# Patient Record
Sex: MALE | Race: BLACK OR AFRICAN AMERICAN | Employment: OTHER | URBAN - METROPOLITAN AREA
[De-identification: names, ages, dates, MRNs, and addresses within clinical notes are randomized per-mention and may not be internally consistent; named-entity substitution may affect disease eponyms.]

---

## 2019-03-11 ENCOUNTER — HOSPITAL ENCOUNTER (EMERGENCY)
Age: 64
Discharge: HOME | End: 2019-03-11
Attending: EMERGENCY MEDICINE | Admitting: EMERGENCY MEDICINE
Payer: OTHER MISCELLANEOUS

## 2019-03-11 VITALS
HEART RATE: 90 BPM | DIASTOLIC BLOOD PRESSURE: 86 MMHG | TEMPERATURE: 93.6 F | RESPIRATION RATE: 20 BRPM | BODY MASS INDEX: 14.07 KG/M2 | SYSTOLIC BLOOD PRESSURE: 134 MMHG | WEIGHT: 95 LBS | OXYGEN SATURATION: 100 % | HEIGHT: 69 IN

## 2019-03-11 DIAGNOSIS — Z51.5 HOSPICE CARE PATIENT: ICD-10-CM

## 2019-03-11 DIAGNOSIS — E86.0 DEHYDRATION: Primary | ICD-10-CM

## 2019-03-11 LAB — GLUCOSE BLD STRIP.AUTO-MCNC: 148 MG/DL (ref 70–110)

## 2019-03-11 PROCEDURE — 82962 GLUCOSE BLOOD TEST: CPT

## 2019-03-11 PROCEDURE — 96360 HYDRATION IV INFUSION INIT: CPT

## 2019-03-11 PROCEDURE — 99284 EMERGENCY DEPT VISIT MOD MDM: CPT

## 2019-03-11 PROCEDURE — 74011250636 HC RX REV CODE- 250/636: Performed by: EMERGENCY MEDICINE

## 2019-03-11 RX ADMIN — SODIUM CHLORIDE 500 ML: 900 INJECTION, SOLUTION INTRAVENOUS at 08:55

## 2019-03-11 NOTE — ED PROVIDER NOTES
EMERGENCY DEPARTMENT HISTORY AND PHYSICAL EXAM    8:42 AM      Date: 3/11/2019  Patient Name: Delmar Moura    History of Presenting Illness     Chief Complaint   Patient presents with    Shortness of Breath    Low Blood Sugar         History Provided By: Patient and Hospice Nurse, Relatives, Friends    Additional History (Context): Delmar Moura is a 61 y.o. male with hypertension and hospice, CAD, cancer who presents via EMS in respiratory distress with CPAP in place. EMS reports the pt had several brief episodes of apnea last several seconds as well as coarse breathing sound, that worsened after DuoNeb so the pt was placed on CPAP. Glucose was initially 33 and increased to 466 after 1 amp of D50. BP was 78/50, pt started on fluids in route. Multiple family members at home report the pt is a DNR. Pt states he is a DNR. Family communicated to the pt's hospice nurese that they were requesting IV fluids for the pt as they suspect he is dehydrated since the pt hasn't been eating or drinking. Pt was recently diagnosed with the flu. The pt denies pain of any kind. No other concerns or symptoms at this time. PCP: Ronda Xavier MD    Chief Complaint: respiratory distress  Duration:  Minutes  Timing:  acute on chronic  Location: n/a  Quality: n/a  Severity: Severe  Modifying Factors: hospice  Associated Symptoms: denies any other associated signs or symptoms      Past History     Past Medical History:  Past Medical History:   Diagnosis Date    CAD (coronary artery disease)     Cancer ([facility] [address]     Laryngeal     Diastolic dysfunction     Fatty liver     Heart attack ([facility] [address]     Hepatitis C     Hypertension     Low back pain     Nephrolithiasis        Past Surgical History:  Past Surgical History:   Procedure Laterality Date    HX CATARACT REMOVAL      HX RETINAL DETACHMENT REPAIR         Family History:  History reviewed. No pertinent family history.     Social History:  Social History     Tobacco Use    Smoking status: Current Every Day Smoker    Smokeless tobacco: Never Used   Substance Use Topics    Alcohol use: No    Drug use: No       Allergies: Allergies   Allergen Reactions    Darvocet A500 [Propoxyphene N-Acetaminophen] Anaphylaxis    Darvon [Propoxyphene] Anaphylaxis    Tramadol Swelling         Review of Systems     Review of Systems   Constitutional: Negative for chills and fever. HENT: Negative for congestion, rhinorrhea, sore throat and trouble swallowing. Eyes: Negative for visual disturbance. Respiratory: Positive for shortness of breath. Negative for cough and wheezing. Cardiovascular: Negative for chest pain and leg swelling. Gastrointestinal: Negative for abdominal pain, nausea and vomiting. Endocrine: Negative for polyuria. Genitourinary: Negative for difficulty urinating and dysuria. Musculoskeletal: Negative for arthralgias and neck stiffness. Skin: Negative for rash. Neurological: Negative for dizziness, weakness, numbness and headaches. Hematological: Does not bruise/bleed easily. Psychiatric/Behavioral: Negative for confusion and dysphoric mood. All other systems reviewed and are negative. Physical Exam     Visit Vitals  /86 (BP 1 Location: Left arm, BP Patient Position: At rest)   Pulse 90   Temp (!) 93.6 °F (34.2 °C)   Resp 20   Ht 5' 9\" (1.753 m)   Wt 43.1 kg (95 lb)   SpO2 100%   BMI 14.03 kg/m²         Physical Exam   Constitutional: He is oriented to person, place, and time. He appears cachectic. Cachetic, speaks a few words here and there   HENT:   Head: Normocephalic and atraumatic. Nose: Nose normal.   Mouth/Throat: Oropharynx is clear and moist. Mucous membranes are dry. No oropharyngeal exudate. Eyes: Conjunctivae are normal. Pupils are equal, round, and reactive to light. No scleral icterus. Neck: Normal range of motion. Neck supple. No tracheal deviation present. Cardiovascular: Normal rate and intact distal pulses. Pulmonary/Chest: Effort normal and breath sounds normal. No stridor. No respiratory distress. He has no wheezes. 82% on RA, HR nml in 90s, /72, respirations 16   Abdominal: Soft. Bowel sounds are normal. He exhibits no distension. There is no tenderness. Musculoskeletal: Normal range of motion. He exhibits no edema or tenderness. No LE edema   Lymphadenopathy:     He has no cervical adenopathy. Neurological: He is oriented to person, place, and time. No cranial nerve deficit. Awake, but somewhat lethargic; responds by shaking head yes and no, says a few words. Moving all extremities. Eomi. Skin: Skin is dry. He is not diaphoretic. Poor skin turgor   Nursing note and vitals reviewed. Diagnostic Study Results     Labs -  Recent Results (from the past 12 hour(s))   GLUCOSE, POC    Collection Time: 03/11/19  9:07 AM   Result Value Ref Range    Glucose (POC) 148 (H) 70 - 110 mg/dL       Radiologic Studies -   No orders to display          Medical Decision Making   I am the first provider for this patient. I reviewed the vital signs, available nursing notes, past medical history, past surgical history, family history and social history. Vital Signs-Reviewed the patient's vital signs. Pulse Oximetry Analysis -  82% on room air (Interpretation)hypoxic    Records Reviewed: Nursing Notes and Old Medical Records (Time of Review: 8:42 AM)    Provider Notes (Medical Decision Making): MDM     Pt place on emergent non-rebreathing after removing CPAP. DDx; known viral illness, on hospice,DNR. Plan to contact hospice and determine scope of visit and what they want me to do. Family just asking for IVF.     Medications   sodium chloride 0.9 % bolus infusion 500 mL (500 mL IntraVENous New Bag 3/11/19 0855)         ED Course: Progress Notes, Reevaluation, and Consults:  8:52 AM Consult:  Discussed care with Rupert An RN hospice (  Standard discussion; including history of patients chief complaint, available diagnostic results, and treatment course. Pt is DNR. Family was hoping for IV fluids. States hospice don't generally recommend this but they wanted to respect family wishs to precede with IVF only, no blood work, no extreme measures. States we are to send the pt back home after pt gets fluids. 10:09 AM  Pt is much more alert and saying he wants to go home. After warm blankets, repeat temp normalized as I'm at bedside. I have discussed plan and dx's with pt and hospice RN. She wants recommends discharge pt back to hospice. Diagnosis     Clinical Impression:   1. Dehydration    2. Hospice care patient        Disposition: d/c    Follow-up Information     Follow up With Specialties Details Why Contact Info    Return to [address] [place] [address]                  Medication List      ASK your doctor about these medications    azithromycin 250 mg tablet  Commonly known as:  ZITHROMAX Z-LACHELLE  Take 2 tablets PO day 1, then take 1 tablet PO day 2-5     chlorpheniramine-HYDROcodone 10-8 mg/5 mL suspension  Commonly known as:  TUSSIONEX PENNKINETIC ER  Take 5 mL by mouth every twelve (12) hours as needed for Cough.          _______________________________    Attestations:  Will [address] [place] [address] acting as a scribe for and in the presence of Yolande Padilla DO.       March 11, 2019 at 8:42 AM       Provider Attestation:      I personally performed the services described in the documentation, reviewed the documentation, as recorded by the scribe in my presence, and it accurately and completely records my words and actions.  March 11, 2019 at 8:42 AM - Yolande Padilla DO.    _______________________________

## 2019-03-11 NOTE — ED NOTES
I have reviewed discharge instructions with the patient and caregiver. The patient and caregiver verbalized understanding. Discharged home via wheelchair, in stable condition.

## 2019-03-11 NOTE — DISCHARGE INSTRUCTIONS
Patient Education        Dehydration: Care Instructions  Your Care Instructions  Dehydration happens when your body loses too much fluid. This might happen when you do not drink enough water or you lose large amounts of fluids from your body because of diarrhea, vomiting, or sweating. Severe dehydration can be life-threatening. Water and minerals called electrolytes help put your body fluids back in balance. Learn the early signs of fluid loss, and drink more fluids to prevent dehydration. Follow-up care is a key part of your treatment and safety. Be sure to make and go to all appointments, and call your doctor if you are having problems. It's also a good idea to know your test results and keep a list of the medicines you take. How can you care for yourself at home? · To prevent dehydration, drink plenty of fluids, enough so that your urine is light yellow or clear like water. Choose water and other caffeine-free clear liquids until you feel better. If you have kidney, heart, or liver disease and have to limit fluids, talk with your doctor before you increase the amount of fluids you drink. · If you do not feel like eating or drinking, try taking small sips of water, sports drinks, or other rehydration drinks. · Get plenty of rest.  To prevent dehydration  · Add more fluids to your diet and daily routine, unless your doctor has told you not to. · During hot weather, drink more fluids. Drink even more fluids if you exercise a lot. Stay away from drinks with alcohol or caffeine. · Watch for the symptoms of dehydration. These include:  ? A dry, sticky mouth. ? Dark yellow urine, and not much of it. ? Dry and sunken eyes. ? Feeling very tired. · Learn what problems can lead to dehydration. These include:  ? Diarrhea, fever, and vomiting. ? Any illness with a fever, such as pneumonia or the flu. ?  Activities that cause heavy sweating, such as endurance races and heavy outdoor work in hot or humid weather. ? Alcohol or drug abuse or withdrawal.  ? Certain medicines, such as cold and allergy pills (antihistamines), diet pills (diuretics), and laxatives. ? Certain diseases, such as diabetes, cancer, and heart or kidney disease. When should you call for help? Call 911 anytime you think you may need emergency care. For example, call if:    · You passed out (lost consciousness).    Call your doctor now or seek immediate medical care if:    · You are confused and cannot think clearly.     · You are dizzy or lightheaded, or you feel like you may faint.     · You have signs of needing more fluids. You have sunken eyes and a dry mouth, and you pass only a little dark urine.     · You cannot keep fluids down.    Watch closely for changes in your health, and be sure to contact your doctor if:    · You are not making tears.     · Your skin is very dry and sags slowly back into place after you pinch it.     · Your mouth and eyes are very dry. Where can you learn more? Go to http://shaan-leonora.info/. Enter N371 in the search box to learn more about \"Dehydration: Care Instructions. \"  Current as of: September 23, 2018  Content Version: 11.9  © 5454-9935 [employer]. Care instructions adapted under license by [employer] (which disclaims liability or warranty for this information). If you have questions about a medical condition or this instruction, always ask your healthcare professional. [place] [address] any warranty or liability for your use of this information. Patient Education        Learning About Hospice and Palliative Care  What are hospice and palliative care? Palliative (say \"PAL-shira-uh-tiv\") care is an area of medicine that helps give you more good days by providing care for quality-of-life issues. It includes treating symptoms like pain, nausea, or sleep problems.  It can also include helping you and your loved ones to:  · Understand your illness better. · Talk more openly about your feelings. · Decide what treatments you want or don't want. · Communicate better with your doctors, nurses, and each other. Hospice care is a type of palliative care. But it's for people who are near the end of life. What kinds of care are involved? Palliative care: This treatment helps you feel better physically, emotionally, and spiritually while doctors also treat your illness. Your care may include pain relief, counseling, or nutrition advice. Hospice care: Again, the goal of this type of care is to help you feel better. And it can help you get the most out of the time you have left. But you no longer get treatment to try to cure your illness. When does care happen? Palliative care: This care can happen at any time during a serious illness. You don't have to be near death to get this care. Hospice care: In most cases, you can choose hospice care when your doctor believes that you have no more than about 6 months to live. Where does the care happen? Palliative care: This care often happens in hospitals or long-term care facilities like nursing homes. It can take place wherever you are treated, even in your home. Hospice care: Most hospice care is done in the place the patient calls \"home. \" This is often the person's home. But it could also be a place like a nursing home or snf center. Hospice care may also be given in hospice centers, hospitals, and other places. Who provides the care? Palliative care: There are doctors and nurses who specialize in this field. But your own doctor may also give some of this care. And there are many other experts who may help you. These include social workers, counselors, therapists, and nutrition experts. Hospice care: In hospitals, hospice centers, and other facilities, care is given by doctors, nurses, and others who are trained in hospice care. In the home, a family member is often the main caregiver.  But the family member gets help from care experts. They are on call 24 hours a day. Where can you learn more? Go to http://shaan-leonora.info/. Enter 466 [id number] in the search box to learn more about \"Learning About Hospice and Palliative Care. \"  Current as of: April 18, 2018  Content Version: 11.9  © 4782-1617 [employer], [employer] Care instructions adapted under license by [employer] (which disclaims liability or warranty for this information). If you have questions about a medical condition or this instruction, always ask your healthcare professional. [place] [address] any warranty or liability for your use of this information.

## 2024-10-16 NOTE — ED TRIAGE NOTES
Per medic, \"we were called because he was short of breath. His accu check was 33 mg/dL. We gave him 1 amp of D50% and his repeat blood glucose was 466 mg/dL. We also put him on C-PAP because he was hypoxic. \" No